# Patient Record
Sex: MALE | Race: WHITE
[De-identification: names, ages, dates, MRNs, and addresses within clinical notes are randomized per-mention and may not be internally consistent; named-entity substitution may affect disease eponyms.]

---

## 2019-12-27 ENCOUNTER — HOSPITAL ENCOUNTER (EMERGENCY)
Dept: HOSPITAL 62 - ER | Age: 69
Discharge: ANOTHER HOSPITAL | End: 2019-12-27
Payer: MEDICARE

## 2019-12-27 VITALS — DIASTOLIC BLOOD PRESSURE: 66 MMHG | SYSTOLIC BLOOD PRESSURE: 90 MMHG

## 2019-12-27 DIAGNOSIS — I62.9: Primary | ICD-10-CM

## 2019-12-27 DIAGNOSIS — M54.2: ICD-10-CM

## 2019-12-27 DIAGNOSIS — J44.9: ICD-10-CM

## 2019-12-27 DIAGNOSIS — F17.210: ICD-10-CM

## 2019-12-27 LAB
ABSOLUTE LYMPHOCYTES# (MANUAL): 0.4 10^3/UL (ref 0.5–4.7)
ABSOLUTE MONOCYTES # (MANUAL): 0.8 10^3/UL (ref 0.1–1.4)
ADD MANUAL DIFF: YES
ALBUMIN SERPL-MCNC: 3.8 G/DL (ref 3.5–5)
ALP SERPL-CCNC: 80 U/L (ref 38–126)
ANION GAP SERPL CALC-SCNC: 9 MMOL/L (ref 5–19)
ANISOCYTOSIS BLD QL SMEAR: SLIGHT
APPEARANCE UR: CLEAR
APTT PPP: (no result) S
AST SERPL-CCNC: 97 U/L (ref 17–59)
BARBITURATES UR QL SCN: NEGATIVE
BASOPHILS NFR BLD MANUAL: 0 % (ref 0–2)
BILIRUB DIRECT SERPL-MCNC: 0.4 MG/DL (ref 0–0.4)
BILIRUB SERPL-MCNC: 2.8 MG/DL (ref 0.2–1.3)
BILIRUB UR QL STRIP: NEGATIVE
BUN SERPL-MCNC: 26 MG/DL (ref 7–20)
CALCIUM: 9.2 MG/DL (ref 8.4–10.2)
CHLORIDE SERPL-SCNC: 97 MMOL/L (ref 98–107)
CO2 SERPL-SCNC: 37 MMOL/L (ref 22–30)
EOSINOPHIL NFR BLD MANUAL: 0 % (ref 0–6)
ERYTHROCYTE [DISTWIDTH] IN BLOOD BY AUTOMATED COUNT: 14.8 % (ref 11.5–14)
ETHANOL SERPL-MCNC: < 10 MG/DL
GLUCOSE SERPL-MCNC: 104 MG/DL (ref 75–110)
GLUCOSE UR STRIP-MCNC: NEGATIVE MG/DL
HCT VFR BLD CALC: 54.8 % (ref 37.9–51)
HGB BLD-MCNC: 18.2 G/DL (ref 13.5–17)
KETONES UR STRIP-MCNC: (no result) MG/DL
MACROCYTES BLD QL SMEAR: (no result)
MCH RBC QN AUTO: 33.4 PG (ref 27–33.4)
MCHC RBC AUTO-ENTMCNC: 33.2 G/DL (ref 32–36)
MCV RBC AUTO: 101 FL (ref 80–97)
METHADONE UR QL SCN: NEGATIVE
MONOCYTES % (MANUAL): 9 % (ref 3–13)
PCP UR QL SCN: NEGATIVE
PH UR STRIP: 5 [PH] (ref 5–9)
PLATELET # BLD: 121 10^3/UL (ref 150–450)
PLATELET COMMENT: (no result)
POTASSIUM SERPL-SCNC: 5.3 MMOL/L (ref 3.6–5)
PROT SERPL-MCNC: 7 G/DL (ref 6.3–8.2)
PROT UR STRIP-MCNC: 100 MG/DL
RBC # BLD AUTO: 5.44 10^6/UL (ref 4.35–5.55)
SEGMENTED NEUTROPHILS % (MAN): 86 % (ref 42–78)
SP GR UR STRIP: 1.02
TOTAL CELLS COUNTED BLD: 100
URINE AMPHETAMINES SCREEN: NEGATIVE
URINE BENZODIAZEPINES SCREEN: NEGATIVE
URINE COCAINE SCREEN: NEGATIVE
URINE MARIJUANA (THC) SCREEN: NEGATIVE
UROBILINOGEN UR-MCNC: 4 MG/DL (ref ?–2)
VARIANT LYMPHS NFR BLD MANUAL: 5 % (ref 13–45)
WBC # BLD AUTO: 8.8 10^3/UL (ref 4–10.5)

## 2019-12-27 PROCEDURE — 31500 INSERT EMERGENCY AIRWAY: CPT

## 2019-12-27 PROCEDURE — 85025 COMPLETE CBC W/AUTO DIFF WBC: CPT

## 2019-12-27 PROCEDURE — 96376 TX/PRO/DX INJ SAME DRUG ADON: CPT

## 2019-12-27 PROCEDURE — 93010 ELECTROCARDIOGRAM REPORT: CPT

## 2019-12-27 PROCEDURE — 94660 CPAP INITIATION&MGMT: CPT

## 2019-12-27 PROCEDURE — 96365 THER/PROPH/DIAG IV INF INIT: CPT

## 2019-12-27 PROCEDURE — 93005 ELECTROCARDIOGRAM TRACING: CPT

## 2019-12-27 PROCEDURE — 72125 CT NECK SPINE W/O DYE: CPT

## 2019-12-27 PROCEDURE — 96375 TX/PRO/DX INJ NEW DRUG ADDON: CPT

## 2019-12-27 PROCEDURE — 51702 INSERT TEMP BLADDER CATH: CPT

## 2019-12-27 PROCEDURE — 80307 DRUG TEST PRSMV CHEM ANLYZR: CPT

## 2019-12-27 PROCEDURE — 71045 X-RAY EXAM CHEST 1 VIEW: CPT

## 2019-12-27 PROCEDURE — 81001 URINALYSIS AUTO W/SCOPE: CPT

## 2019-12-27 PROCEDURE — 80053 COMPREHEN METABOLIC PANEL: CPT

## 2019-12-27 PROCEDURE — 96367 TX/PROPH/DG ADDL SEQ IV INF: CPT

## 2019-12-27 PROCEDURE — 70450 CT HEAD/BRAIN W/O DYE: CPT

## 2019-12-27 PROCEDURE — 99291 CRITICAL CARE FIRST HOUR: CPT

## 2019-12-27 PROCEDURE — 36415 COLL VENOUS BLD VENIPUNCTURE: CPT

## 2019-12-27 PROCEDURE — 94002 VENT MGMT INPAT INIT DAY: CPT

## 2019-12-27 PROCEDURE — 84484 ASSAY OF TROPONIN QUANT: CPT

## 2019-12-27 NOTE — ER DOCUMENT REPORT
Entered by TAWANA STALLINGS SCRIBE  12/27/19 9857 





Acting as scribe for:ELIZABETH NOWAK IV, MD





ED General





- General


Chief Complaint: Altered Mental Status


Stated Complaint: FALL


Time Seen by Provider: 12/27/19 16:49


Primary Care Provider: 


JUAN GONZALEZ PA-C [Primary Care Provider] - Follow up as needed


Mode of Arrival: Medic


Information source: Relative, Emergency Med Personnel


Cannot obtain history due to: Unstable vital signs, Altered mental status


Notes: 





This 69-year-old male patient presents to the emergency department today via EMS

for chief complaint of a fall with altered mental status per EMS.  EMS states 

that on arrival they found the patient to be cyanotic on the floor with 

constricted pupils bilaterally and a room air oxygen saturation of 44%.  





About an hour after patient arrival, several members of the patient's family 

arrived and they were able to provide us with the following history: 


Family states that about 3 years ago the patient was diagnosed with a "tumor" 

but they were unable to provide any information such as the location(s) of said 

tumor(s) or what type of tumor(s) these were.  They do mention that it the 

tumor(s) are affecting his "spine and brain" but that is all they are able to 

provide.  The patient's son mentions that the patient "just is not happy anymore

and probably tried to kill himself".  Family reports that when the patient was 

diagnosed with this "tumor" 3 years ago he was told that it would progress to 

the point that he would be unable to provide for himself.  According to history 

from the family, it seems as if the patient has recently gotten to this point as

family mentions that he has "complete loss of function of one arm and near loss 

of function of the other arm", he is not eating, and he has no control over his 

bowels. Family reports that "it has gotten to the point where he is physically 

unable to take care of himself now but he refuses to let any of us help him".  

Family reports that the patient has mentioned in the past that if and when he 

got to this point, he would have no desire to continue living. 





EMS pre-hospital interventions: 


Placed on 15L via nonrebreather, oxygen saturation chyna to 99%.  2 mg of Narcan 

due to apnea.  Albuterol.  Placed in c-collar due to complaints of neck pain.


TRAVEL OUTSIDE OF THE [place] IN LAST 30 DAYS: No





- Related Data


Allergies/Adverse Reactions: 


                                        





No Known Allergies Allergy (Unverified 12/27/19 18:49)


   











Past Medical History





- General


Information source: Relative, Emergency Med Personnel


Cannot obtain history due to: Unstable vital signs, Altered mental status





- Social History


Smoking Status: Current Every Day Smoker


Cigarette use (# per day): Yes


Family History: Reviewed & Not Pertinent


Patient has suicidal ideation: No


Patient has homicidal ideation: No





- Past Medical History


Cardiac Medical History: Reports: Hx Congestive Heart Failure


Pulmonary Medical History: Reports: Hx COPD


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

the tumor is or what kind of tumor it is





Review of Systems





- Review of Systems


-: Yes ROS unobtainable due to patient's medical condition





Physical Exam





- Vital signs


Vitals: 


                                        











Resp Pulse Ox


 


 27 H  97 


 


 12/27/19 16:39  12/27/19 16:39














- General


General appearance: Unresponsive - GCS 3


In distress: Severe





- HEENT


Head: Normocephalic, Atraumatic


Conjunctiva: Normal


Pupils: Pinpoint


Pharynx: No: Blood in hypopharynx





- Respiratory


Respiratory status: Respiratory distress, Other - Very shallow, diminished, 

labored respirations with diffuse wheezing.  Respiratory rate of less than 5 on 

arrival.


Breath sounds: Other - FAINT BILATERALLY


Chest palpation: Normal





- Cardiovascular


Rhythm: Regular


Heart sounds: Normal auscultation


Murmur: No





- Abdominal


Inspection: Normal


Distension: No distension


Bowel sounds: Normal





- Extremities


General upper extremity: Normal inspection, Normal color.  No: Edema


General lower extremity: Normal inspection, Normal color.  No: Edema





- Neurological


Neuro grossly intact: No





- Skin


Skin Temperature: Cool


Skin Moisture: Dry


Skin Color: Normal





Course





- Re-evaluation


Re-evalutation: 





12/27/19 18:44


1706





- Vital Signs


Vital signs: 


                                        











Temp Pulse Resp BP Pulse Ox


 


 97.8 F      14   107/76   100 


 


 12/27/19 19:16     12/27/19 19:16  12/27/19 19:16  12/27/19 19:16














- Laboratory


Result Diagrams: 


                                 12/27/19 18:23





                                 12/27/19 18:23


Laboratory results interpreted by me: 


                                        











  12/27/19 12/27/19





  17:23 18:23


 


Hgb   18.2 H


 


Hct   54.8 H


 


MCV   101 H


 


RDW   14.8 H


 


Plt Count   121 L


 


Seg Neuts % (Manual)   86 H


 


Lymphocytes % (Manual)   5 L


 


Abs Lymphs (Manual)   0.4 L


 


Urine Protein  100 H 


 


Urine Ketones  TRACE H 


 


Urine Urobilinogen  4.0 H 


 


Urine Ascorbic Acid  40 H 














- Diagnostic Test


Radiology reviewed: Reports reviewed





- Consults


  ** DR. MOYA


Time consulted: 19:09 - DR. MOYA ACCEPTED PT FOR TRANSFER TO HIS FACILITY


Reason for consultation: 





12/27/19 19:39


HEMORRHAGIC CVA





Procedures





- Intubation


  ** Orotracheal


Time of Intubation: 17:06


Airway evaluation: Normal anatomy


Mallampati Classification: Class 2


Medications: Etomidate, Succinylcholine


Intubation method: Orotracheal


Blade type: Linda


Blade size: 4


Equipment used: Glidescope


ETT size: 7.5


ETT secured at: Gums


ETT secured at (cm): 21


Breath Sounds after Intubation: Equal


End tidal CO2 confirmed: Yes


Post Intubation Xray: Yes


Intubation Complications: No complications





Critical Care Note





- Critical Care Note


Total time excluding time spent on procedures (mins): 60





Discharge





- Discharge


Clinical Impression: 


 Hemorrhagic cerebrovascular accident (CVA)





Condition: Critical


Disposition: [facility]


Referrals: 


JUAN GONZALEZ PA-C [Primary Care Provider] - Follow up as needed





I personally performed the services described in the documentation, reviewed and

edited the documentation which was dictated to the scribe in my presence, and it

accurately records my words and actions.

## 2019-12-27 NOTE — RADIOLOGY REPORT (SQ)
EXAM DESCRIPTION:  CT CERVICAL SPINE WITHOUT



COMPLETED DATE/TIME:  12/27/2019 5:55 pm



REASON FOR STUDY:  ams, neck pain



COMPARISON:  None.



TECHNIQUE:  Axial images acquired through the cervical spine without intravenous contrast.  Images re
viewed with lung, soft tissue and bone windows.  Reconstructed coronal and sagittal MPR images review
ed.  Images stored on PACS.

All CT scanners at this facility use dose modulation, iterative reconstruction, and/or weight based d
osing when appropriate to reduce radiation dose to as low as reasonably achievable (ALARA).

[facility]: Dose Right  [facility]: CareDose    [facility]: Dose Right    [facility]: Teradose 4D    [facility]: Smart Technologies



RADIATION DOSE:  CT Rad equipment meets quality standard of care and radiation dose reduction techniq
ues were employed. CTDIvol: 19.7 mGy. DLP: 472 mGy-cm. mGy.



LIMITATIONS:  None.



FINDINGS:  ALIGNMENT: Anatomic.

MINERALIZATION: Normal.

VERTEBRAL BODIES: No fractures or dislocation.

DISCS: Multilevel disc space narrowing with osteophytes.

FACETS, LATERAL MASSES, POSTERIOR ELEMENTS: Facet arthropathy.  No fractures.  No dislocation.  No ac
[place] findings.

HARDWARE: Partially imaged endotracheal tube.

VISUALIZED RIBS: No fractures.

LUNG APICES AND SOFT TISSUES: Marked centrilobular and paraseptal emphysematous changes.  No pneumoth
orax.

OTHER: No other significant finding.



IMPRESSION:  No evidence of acute osseous injury.  Background of multilevel spondylotic changes and c
hronic obstructive pulmonary disease.



TECHNICAL DOCUMENTATION:  JOB ID:  [id number]

Quality ID # 436: Final reports with documentation of one or more dose reduction techniques (e.g., Au
tomated exposure control, adjustment of the mA and/or kV according to patient size, use of iterative 
reconstruction technique)

 2011 [employer]- All Rights Reserved



Reading location - IP/workstation name: [email or web address]

## 2019-12-27 NOTE — RADIOLOGY REPORT (SQ)
EXAM DESCRIPTION:  CHEST SINGLE VIEW



COMPLETED DATE/TIME:  12/27/2019 5:32 pm



REASON FOR STUDY:  et/OG tube placement



COMPARISON:  None.



EXAM PARAMETERS:  NUMBER OF VIEWS: One view.

TECHNIQUE: Single frontal radiographic view of the chest acquired.

RADIATION DOSE: NA

LIMITATIONS: None.



FINDINGS:  LUNGS AND PLEURA: Elevated left hemidiaphragm.  Left pleural effusion.  Retrocardiac opaci
fication.  Pulmonary vascular congestion.

MEDIASTINUM AND HILAR STRUCTURES: No masses.  Contour normal.

HEART AND VASCULAR STRUCTURES: Heart normal in size.  Normal vasculature.

BONES: No acute findings.

HARDWARE: Endotracheal tube has its tip 5 cm above the bryce.  OG tube is not seen.

OTHER: No other significant finding.



IMPRESSION:  Left pleural effusion.  Pulmonary vascular congestion.  Cannot exclude airspace disease 
in left lower lobe.  Endotracheal tube as described.



TECHNICAL DOCUMENTATION:  JOB ID:  [id number]

 2011 [employer]- All Rights Reserved



Reading location - IP/workstation name: [email or web address]

## 2019-12-27 NOTE — RADIOLOGY REPORT (SQ)
EXAM DESCRIPTION:  CT HEAD WITHOUT



COMPLETED DATE/TIME:  12/27/2019 5:55 pm



REASON FOR STUDY:  ams, neck pain



COMPARISON:  None.



TECHNIQUE:  Axial images acquired through the brain without intravenous contrast.  Images reviewed wi
th bone, brain and subdural windows.  Additional sagittal and coronal reconstructions were generated.
 Images stored on PACS.

All CT scanners at this facility use dose modulation, iterative reconstruction, and/or weight based d
osing when appropriate to reduce radiation dose to as low as reasonably achievable (ALARA).

[facility]: Dose Right  [facility]: CareDose    [facility]: Dose Right    [facility]: Teradose 4D    [facility]: Smart Technologies



RADIATION DOSE:  CT Rad equipment meets quality standard of care and radiation dose reduction techniq
ues were employed. CTDIvol: 53.2 mGy. DLP: 1124 mGy-cm. mGy.



LIMITATIONS:  None.



FINDINGS:  VENTRICLES: Normal size and contour.

CEREBRUM: Left temporal lobe parenchymal hemorrhage with subarachnoid component.  No significant midl
ine shift at this time. Gray- white matter differentiation and attenuation are otherwise normal.

CEREBELLUM: No masses.  No hemorrhage.  No alteration of density.  No evidence for acute infarction.

EXTRAAXIAL SPACES: No masses.  Subarachnoid blood products as above.

ORBITS AND GLOBE: No intra- or extraconal masses.  Normal contour of globe without masses.

CALVARIUM: No fracture.

PARANASAL SINUSES: Ethmoid air cell opacities are not an unexpected finding in the intubated patient.


SOFT TISSUES: No large hematoma or scalp injury evident.

OTHER: No other significant finding.



IMPRESSION:  Left temporal lobe parenchymal hemorrhage with subarachnoid component.

EVIDENCE OF ACUTE STROKE: NO.



COMMENT:   Pertinent findings on the imaging study reported as a CRITICAL RESULT to ELIZABETH SOTELO at18:22 on 12/27/2019.

Category of Critical Result: Intracranial hemorrhage.

Quality ID # 436: Final reports with documentation of one or more dose reduction techniques (e.g., Au
tomated exposure control, adjustment of the mA and/or kV according to patient size, use of iterative 
reconstruction technique)



TECHNICAL DOCUMENTATION:  JOB ID:  [id number]

 2011 [employer]- All Rights Reserved



Reading location - IP/workstation name: [email or web address]

## 2019-12-28 NOTE — EKG REPORT
SEVERITY:- ABNORMAL ECG -

SINUS RHYTHM

RIGHT AXIS DEVIATION

NONSPECIFIC T ABNORMALITIES, LATERAL LEADS

:

Confirmed by: Alexandra Vaughn MD 28-Dec-2019 13:48:57